# Patient Record
Sex: FEMALE | Race: WHITE | HISPANIC OR LATINO | Employment: UNEMPLOYED | URBAN - METROPOLITAN AREA
[De-identification: names, ages, dates, MRNs, and addresses within clinical notes are randomized per-mention and may not be internally consistent; named-entity substitution may affect disease eponyms.]

---

## 2017-11-02 ENCOUNTER — OFFICE VISIT (OUTPATIENT)
Dept: GASTROENTEROLOGY | Facility: CLINIC | Age: 57
End: 2017-11-02
Payer: MEDICARE

## 2017-11-02 VITALS
DIASTOLIC BLOOD PRESSURE: 80 MMHG | SYSTOLIC BLOOD PRESSURE: 136 MMHG | WEIGHT: 114.44 LBS | HEART RATE: 60 BPM | BODY MASS INDEX: 19.07 KG/M2 | HEIGHT: 65 IN

## 2017-11-02 DIAGNOSIS — R10.84 GENERALIZED ABDOMINAL PAIN: ICD-10-CM

## 2017-11-02 DIAGNOSIS — K52.9 CHRONIC DIARRHEA: Primary | ICD-10-CM

## 2017-11-02 DIAGNOSIS — R11.0 NAUSEA: ICD-10-CM

## 2017-11-02 DIAGNOSIS — R63.4 WEIGHT LOSS: ICD-10-CM

## 2017-11-02 PROCEDURE — 99999 PR PBB SHADOW E&M-NEW PATIENT-LVL III: CPT | Mod: PBBFAC,,, | Performed by: INTERNAL MEDICINE

## 2017-11-02 PROCEDURE — 99204 OFFICE O/P NEW MOD 45 MIN: CPT | Mod: S$PBB,,, | Performed by: INTERNAL MEDICINE

## 2017-11-02 PROCEDURE — 99203 OFFICE O/P NEW LOW 30 MIN: CPT | Mod: PBBFAC | Performed by: INTERNAL MEDICINE

## 2017-11-02 RX ORDER — METOPROLOL TARTRATE 50 MG/1
50 TABLET ORAL 2 TIMES DAILY
COMMUNITY
Start: 2017-10-03

## 2017-11-02 RX ORDER — DIPHENOXYLATE HYDROCHLORIDE AND ATROPINE SULFATE 2.5; .025 MG/1; MG/1
1 TABLET ORAL 4 TIMES DAILY PRN
COMMUNITY

## 2017-11-02 RX ORDER — TOPIRAMATE 100 MG/1
100 TABLET, FILM COATED ORAL 2 TIMES DAILY
COMMUNITY
Start: 2017-10-03

## 2017-11-02 RX ORDER — RIZATRIPTAN BENZOATE 10 MG/1
10 TABLET, ORALLY DISINTEGRATING ORAL
COMMUNITY
Start: 2017-10-02

## 2017-11-02 RX ORDER — TERIPARATIDE 250 UG/ML
INJECTION, SOLUTION SUBCUTANEOUS DAILY
COMMUNITY
Start: 2017-10-23 | End: 2020-05-07

## 2017-11-02 RX ORDER — DICYCLOMINE HYDROCHLORIDE 20 MG/1
20 TABLET ORAL 4 TIMES DAILY
COMMUNITY
Start: 2017-09-05

## 2017-11-02 RX ORDER — PROMETHAZINE HYDROCHLORIDE 6.25 MG/5ML
25 SYRUP ORAL EVERY 6 HOURS PRN
COMMUNITY

## 2017-11-02 NOTE — PROGRESS NOTES
Subjective:       Patient ID: Sarai Sarmiento is a 56 y.o. female.    Chief Complaint: Diarrhea and Abdominal Pain    Diarrhea going on for 3 years. She has diarrhea all day every day by her history. She has large volume BMs. She has no BRBPR or melena. She has lc-umbilical abdominal pain that cramping in character that is generally associated with BMs. There is nausea and vomiting, mainly bilious or of food eaten. There is aversion to the sight and smell of food. She has early satiety.     The diarrhea is mainly watery. She has diarrhea whether she eats or not though it maybe more if she eats more; so she tries to space her meals and eat a very small amount at a time. She is on medication for this with lomotil or she can't leave home. Response is variable. She has lost ~30 pounds over the last 3-4 yeas. There's no fever, chills or sweats. No family or aquaintances with similar issues. There's no FH of IBD or IBS and no GI malignancies.     She has had both upper and lower endoscopies with biopsies with no answers. She has had stool studies done but don't have access to records.           Review of Systems   Constitutional: Positive for chills and unexpected weight change. Negative for activity change, appetite change, diaphoresis, fatigue and fever.   HENT: Negative for congestion, ear discharge, ear pain, hearing loss, nosebleeds, postnasal drip and tinnitus.    Eyes: Negative for photophobia and visual disturbance.   Respiratory: Negative for apnea, cough, choking, chest tightness, shortness of breath and wheezing.    Cardiovascular: Negative for chest pain, palpitations and leg swelling.   Gastrointestinal: Positive for abdominal pain, diarrhea, nausea and vomiting. Negative for abdominal distention, anal bleeding, blood in stool, constipation and rectal pain.   Genitourinary: Negative for difficulty urinating, dyspareunia, dysuria, flank pain, frequency, hematuria, menstrual problem, pelvic pain, urgency,  vaginal bleeding and vaginal discharge.   Musculoskeletal: Positive for back pain. Negative for arthralgias, gait problem, joint swelling, myalgias and neck stiffness.        Low back paIn   Skin: Negative for pallor and rash.   Neurological: Positive for headaches. Negative for dizziness, tremors, seizures, syncope, speech difficulty, weakness and numbness.   Hematological: Negative for adenopathy.   Psychiatric/Behavioral: Negative for agitation, confusion, hallucinations, sleep disturbance and suicidal ideas.       Objective:      Physical Exam   Constitutional: She is oriented to person, place, and time. She appears well-developed and well-nourished.   HENT:   Head: Normocephalic and atraumatic.   Eyes: Conjunctivae and EOM are normal. Pupils are equal, round, and reactive to light. Right eye exhibits no discharge. Left eye exhibits no discharge. No scleral icterus.   Neck: Normal range of motion. Neck supple. No JVD present. No thyromegaly present.   Cardiovascular: Normal rate, regular rhythm, normal heart sounds and intact distal pulses.  Exam reveals no gallop and no friction rub.    No murmur heard.  Pulmonary/Chest: Effort normal and breath sounds normal. No respiratory distress. She has no wheezes. She has no rales. She exhibits no tenderness.   Abdominal: Soft. Bowel sounds are normal. She exhibits no distension and no mass. There is tenderness. There is no rebound and no guarding.   Mild diffuse abdominal tenderness   Musculoskeletal: Normal range of motion. She exhibits no edema.   Lymphadenopathy:     She has no cervical adenopathy.   Neurological: She is alert and oriented to person, place, and time. She has normal reflexes. She exhibits normal muscle tone. Coordination normal.   Skin: Skin is warm and dry. No rash noted. No erythema. No pallor.   Psychiatric: She has a normal mood and affect. Her behavior is normal. Judgment and thought content normal.       Assessment:     Chronic diarrhea     Nausea    Abdominal pain associated with bowel movements             Endoscopies have been normal but patient's history    Weight loss  No diagnosis found.    Plan:     Review patient's records.  Recommendations of additional evaluation pending assessment of other records.

## 2017-11-02 NOTE — LETTER
November 4, 2017      Irwin Arguello MD  [address]  [facility] [address]           [facility]  [address] [place] [id number]  Phone: [phone]  Fax: [phone]          Patient: Sarai Sarmiento   MR Number: [id number]   YOB: 1960   Date of Visit: 11/2/2017       Dear Dr. Irwin Arguello:    Thank you for referring Sarai Sarmiento to me for evaluation. Attached you will find relevant portions of my assessment and plan of care.    If you have questions, please do not hesitate to call me. I look forward to following Sarai Sarmiento along with you.    Sincerely,    Meek Mijares III, MD    Enclosure  CC:  No Recipients    If you would like to receive this communication electronically, please contact [email or web address] or [phone] to request more information on [employer] Link access.    For providers and/or their staff who would like to refer a patient to [facility], please contact us through our one-stop-shop provider referral line, [facility], at [phone].    If you feel you have received this communication in error or would no longer like to receive these types of communications, please e-mail [email or web address]

## 2017-11-24 ENCOUNTER — TELEPHONE (OUTPATIENT)
Dept: GASTROENTEROLOGY | Facility: CLINIC | Age: 57
End: 2017-11-24

## 2017-11-24 NOTE — TELEPHONE ENCOUNTER
Spoke with patient and informed patient that records from [facility] were received and scanned in chart. Patient said okay.

## 2020-05-07 ENCOUNTER — OFFICE VISIT (OUTPATIENT)
Dept: FAMILY MEDICINE | Facility: CLINIC | Age: 60
End: 2020-05-07
Payer: MEDICARE

## 2020-05-07 VITALS
BODY MASS INDEX: 19.49 KG/M2 | HEIGHT: 65 IN | SYSTOLIC BLOOD PRESSURE: 122 MMHG | HEART RATE: 62 BPM | WEIGHT: 117 LBS | TEMPERATURE: 98 F | OXYGEN SATURATION: 99 % | RESPIRATION RATE: 16 BRPM | DIASTOLIC BLOOD PRESSURE: 78 MMHG

## 2020-05-07 DIAGNOSIS — B49 FUNGAL INFECTION: Primary | ICD-10-CM

## 2020-05-07 PROCEDURE — 99204 PR OFFICE/OUTPT VISIT, NEW, LEVL IV, 45-59 MIN: ICD-10-PCS | Mod: S$GLB,,, | Performed by: NURSE PRACTITIONER

## 2020-05-07 PROCEDURE — 99204 OFFICE O/P NEW MOD 45 MIN: CPT | Mod: S$GLB,,, | Performed by: NURSE PRACTITIONER

## 2020-05-07 RX ORDER — AMLODIPINE BESYLATE 2.5 MG/1
TABLET ORAL
COMMUNITY
Start: 2020-04-17

## 2020-05-07 RX ORDER — MICAFUNGIN 20 MG/ML
100 INJECTION, POWDER, LYOPHILIZED, FOR SOLUTION INTRAVENOUS DAILY
Qty: 1 EACH | Refills: 60 | Status: SHIPPED | OUTPATIENT
Start: 2020-05-07 | End: 2020-05-07

## 2020-05-07 RX ORDER — MICAFUNGIN 20 MG/ML
100 INJECTION, POWDER, LYOPHILIZED, FOR SOLUTION INTRAVENOUS DAILY
Qty: 1 EACH | Refills: 60 | Status: SHIPPED | OUTPATIENT
Start: 2020-05-07

## 2020-05-07 RX ORDER — BENRALIZUMAB 30 MG/ML
INJECTION, SOLUTION SUBCUTANEOUS
COMMUNITY
Start: 2020-05-06

## 2020-05-07 RX ORDER — ALBUTEROL SULFATE 90 UG/1
AEROSOL, METERED RESPIRATORY (INHALATION)
COMMUNITY
Start: 2020-04-26

## 2020-05-07 RX ORDER — OXYCODONE HYDROCHLORIDE 15 MG/1
TABLET ORAL
COMMUNITY
Start: 2020-02-24

## 2020-05-07 RX ORDER — MICAFUNGIN 20 MG/ML
100 INJECTION, POWDER, LYOPHILIZED, FOR SOLUTION INTRAVENOUS DAILY
COMMUNITY
Start: 2020-04-09 | End: 2020-05-07 | Stop reason: SDUPTHER

## 2020-05-07 NOTE — LETTER
May 7, 2020      [address]  [facility] [address]           [facility]Infectious Disease  [address] [facility] [address]  Phone: [phone]  Fax: [phone]          Patient: Sarai Sarmiento   MR Number: [id number]   YOB: 1960   Date of Visit: 5/7/2020       Dear Zulma Sarmiento:    Thank you for referring Sarai Sarmiento to me for evaluation. Attached you will find relevant portions of my assessment and plan of care.    If you have questions, please do not hesitate to call me. I look forward to following Sarai Sarmiento along with you.    Sincerely,    Kaegan Morales, NP    Enclosure  CC:  No Recipients    If you would like to receive this communication electronically, please contact [email or web address] or [phone] to request more information on [employer] Link access.    For providers and/or their staff who would like to refer a patient to [facility], please contact us through our one-stop-shop provider referral line, [facility], at [phone].    If you feel you have received this communication in error or would no longer like to receive these types of communications, please e-mail [email or web address]

## 2020-05-07 NOTE — ASSESSMENT & PLAN NOTE
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     Bronchoscopy performed secondary to chronic cough, hemoptysis, history of smoking, mild persistent asthma, and chronic bronchitis in spite of at least 4 rounds of antibiotics and several rounds of corticosteroids.    Micafungin IV started on 04/14/2020 and Fasenra 04/17/2020    An additional 2 weeks of Micafungin was ordered.     She reports gradual improvement with her symptoms.    She will need 3-6 mts of therapy total. She will get a PICC line and f/u here in 1 mt.  CBC, CMP q monthly.   She will continue with IV peripheral until we can get PICC line in place. Side effects from meds d/w patient.  Length of treatment in expectations discussed with the patient.  Case discussed with Dr. Joel Mott. Agrees to all of the above.

## 2020-05-07 NOTE — PROGRESS NOTES
Infectious Diseases Clinic Note    Subjective:       Patient ID: Sarai Sarmiento is a 59 y.o. female     Chief Complaint: Referral        Sarai Sarmiento is a 59 y.o. female here today for consultation regarding scopulariopsis.  This is a new diagnosis to me. I am seeing this patient in the interim for Dr. Joel Mott.  Referral from Zulma Sarmiento .   Primary care provider is Amy Zhang MD .    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    Bronchoscopy performed secondary to chronic cough, hemoptysis, history of smoking, mild persistent asthma, and chronic bronchitis in spite of at least 4 rounds of antibiotics and several rounds of corticosteroids. + nocturnal cough that is worse with laying down. Denies GERD but recently started on Prilosec and Singulair.  Followed by Dr. Elías Nguyễn.  Recently seen by Zulma WILLINGHAM on 4/27/20.    Micafungin IV started on 04/14/2020 and Fasenra 04/17/2020. She has received a total of 4 weeks Micafungin IV. She is getting daily therapy at [facility] in .     She reports gradual improvement with her symptoms since starting Mycafungin.  She continues to have a cough with sputum production is brownish in color. Denies SOB, CARDOSO, CP. Has periodic wheezing that is worsened with heat. Otherwise she feels fine. Feels like she is improving overall.  Denies side effects from med. Denies N/V/D, jaundice, abdominal pain, fever, chills. No weight loss or loss of appetite. No acute issues reported at this time.     She is accompanied by her .                  Past Medical History:   Diagnosis Date    Asthma     Endometriosis     Fungal infection     Irritable bowel syndrome     Osteoporosis        Social History     Socioeconomic History    Marital status:      Spouse name: Not on file    Number of  children: Not on file    Years of education: Not on file    Highest education level: Not on file   Occupational History    Not on file   Social Needs    Financial resource strain: Not on file    Food insecurity:     Worry: Not on file     Inability: Not on file    Transportation needs:     Medical: Not on file     Non-medical: Not on file   Tobacco Use    Smoking status: Never Smoker    Smokeless tobacco: Never Used   Substance and Sexual Activity    Alcohol use: No    Drug use: No    Sexual activity: Yes     Partners: Male   Lifestyle    Physical activity:     Days per week: Not on file     Minutes per session: Not on file    Stress: Not on file   Relationships    Social connections:     Talks on phone: Not on file     Gets together: Not on file     Attends Oriental orthodox service: Not on file     Active member of club or organization: Not on file     Attends meetings of clubs or organizations: Not on file     Relationship status: Not on file   Other Topics Concern    Not on file   Social History Narrative    Not on file         Current Outpatient Medications:     albuterol (PROVENTIL/VENTOLIN HFA) 90 mcg/actuation inhaler, INHALE 1 TO 2 PUFFS BY MOUTH EVERY 4 HOURS, Disp: , Rfl:     amLODIPine (NORVASC) 2.5 MG tablet, , Disp: , Rfl:     diphenoxylate-atropine 2.5-0.025 mg (LOMOTIL) 2.5-0.025 mg per tablet, Take 1 tablet by mouth 4 (four) times daily as needed for Diarrhea., Disp: , Rfl:     FASENRA PEN 30 mg/mL AtIn, , Disp: , Rfl:     MAXALT-MLT 10 mg disintegrating tablet, Take 10 mg by mouth as needed. , Disp: , Rfl:     metoprolol tartrate (LOPRESSOR) 50 MG tablet, Take 50 mg by mouth 2 (two) times daily. , Disp: , Rfl:     micafungin 100 mg SolR, Inject 100 mg into the vein once daily., Disp: 1 each, Rfl: 60    oxyCODONE (ROXICODONE) 15 MG Tab, , Disp: , Rfl:     promethazine (PHENERGAN) 6.25 mg/5 mL syrup, Take 25 mg by mouth every 6 (six) hours as needed for Nausea., Disp: , Rfl:      topiramate (TOPAMAX) 100 MG tablet, Take 100 mg by mouth 2 (two) times daily. , Disp: , Rfl:     dicyclomine (BENTYL) 20 mg tablet, Take 20 mg by mouth 4 (four) times daily. , Disp: , Rfl:     FORTEO 20 mcg/dose - 600 mcg/2.4 mL PnIj, once daily. , Disp: , Rfl:     Review of Systems   Constitutional: Negative for appetite change, chills, diaphoresis, fever and unexpected weight change.   HENT: Negative for congestion, drooling, facial swelling, mouth sores, postnasal drip, rhinorrhea, sinus pain, sneezing, sore throat, tinnitus, trouble swallowing and voice change.    Eyes: Negative for photophobia and visual disturbance.   Respiratory: Positive for cough. Negative for apnea, choking, shortness of breath and wheezing.    Cardiovascular: Negative for chest pain, palpitations and leg swelling.   Gastrointestinal: Negative for abdominal pain, blood in stool, diarrhea, nausea and vomiting.   Endocrine: Negative for polydipsia, polyphagia and polyuria.   Genitourinary: Negative for difficulty urinating, dysuria, flank pain, frequency, hematuria and urgency.   Musculoskeletal: Negative for joint swelling and neck stiffness.   Skin: Negative for rash.   Neurological: Negative for dizziness, tremors, seizures, syncope, facial asymmetry and speech difficulty.   Hematological: Does not bruise/bleed easily.   Psychiatric/Behavioral: Negative for confusion, hallucinations, self-injury and suicidal ideas. The patient is not hyperactive.    All other systems reviewed and are negative.          Objective:      Vitals:    05/07/20 1309   BP: 122/78   Pulse: 62   Resp: 16   Temp: 98.1 °F (36.7 °C)     Physical Exam   Constitutional: She is oriented to person, place, and time. Vital signs are normal. She appears well-developed and well-nourished. She is active and cooperative.  Non-toxic appearance. She does not have a sickly appearance. She does not appear ill. No distress.   HENT:   Head: Normocephalic.   Nose: Nose normal.    Eyes: Pupils are equal, round, and reactive to light.   Neck: Normal range of motion.   Cardiovascular: Normal rate and regular rhythm.   Pulmonary/Chest: Effort normal and breath sounds normal. She has no wheezes. She has no rhonchi. She has no rales.   Musculoskeletal: Normal range of motion. She exhibits no edema.   Neurological: She is alert and oriented to person, place, and time.   Skin: Skin is warm and dry. No petechiae and no rash noted. No pallor.   Psychiatric: She has a normal mood and affect. Her speech is normal and behavior is normal. Judgment and thought content normal. Cognition and memory are normal.   Nursing note and vitals reviewed.          Assessment/Plan:       1. Fungal infection      Problem List Items Addressed This Visit        ID    Fungal infection - Primary    Overview     Scopulariopsis per bronchoscopy.              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     Bronchoscopy performed secondary to chronic cough, hemoptysis, history of smoking, mild persistent asthma, and chronic bronchitis in spite of at least 4 rounds of antibiotics and several rounds of corticosteroids.    Micafungin IV started on 04/14/2020 and Fasenra 04/17/2020    An additional 2 weeks of Micafungin was ordered.     She reports gradual improvement with her symptoms.    She will need 3-6 mts of therapy total. She will get a PICC line and f/u here in 1 mt.  CBC, CMP q monthly.   She will continue with IV peripheral until we can get PICC line in place. Side effects from meds d/w patient.  Length of treatment in expectations discussed with the patient.  Case discussed with Dr. Joel Mott. Agrees to all of the above.          Relevant Medications    micafungin 100 mg SolR    Other Relevant Orders    Outpatient PICC Insertion     X-Ray Chest 1 View for PICC_Central line    PICC catheter may be used for therapy after catheter placements and confirmed by CXR    CBC auto differential    Comprehensive metabolic panel         All questions were answered to her liking.  Patient voiced understanding of all the instructions given.  She will follow-up here in 1 month.     5/8/20 @ 1300 - Spoke to the pt. Will give her Xanax PRN anxiety to take 1 hr prior to her procedure.  Spoke with Miesha Colon RN at [facility]. OK with giving her Xanax before procedure. PT notified and agrees to proceed as scheduled on Tuesday.     Follow up:     There are no Patient Instructions on file for this visit.     Follow up in about 1 month (around 6/7/2020), or if symptoms worsen or fail to improve.

## 2020-05-08 RX ORDER — ALPRAZOLAM 0.5 MG/1
TABLET ORAL
Qty: 3 TABLET | Refills: 0 | Status: SHIPPED | OUTPATIENT
Start: 2020-05-08

## 2020-05-11 LAB
ALBUMIN SERPL BCP-MCNC: 4.2 G/DL (ref 3.4–5)
ALP SERPL-CCNC: 61 U/L (ref 45–117)
ALT SERPL W P-5'-P-CCNC: 58 U/L (ref 13–56)
ANION GAP SERPL CALC-SCNC: 9 MMOL/L (ref 3–11)
AST SERPL-CCNC: 27 U/L (ref 15–37)
BILIRUB SERPL-MCNC: 1 MG/DL (ref 0.2–1)
BUN SERPL-MCNC: 19 MG/DL (ref 7–18)
CALCIUM BLD-MCNC: NORMAL MG/DL
CALCIUM SERPL-MCNC: 10.1 MG/DL (ref 8.5–10.1)
CHLORIDE SERPL-SCNC: 110 MMOL/L (ref 98–107)
CO2 SERPL-SCNC: 25 MMOL/L (ref 21–32)
COVID-19 AB, IGM: NORMAL
CREAT SERPL-MCNC: 0.99 MG/DL (ref 0.55–1.02)
ERYTHROCYTE [DISTWIDTH] IN BLOOD BY AUTOMATED COUNT: 12 % (ref 0–15.5)
GFR ESTIMATION: 58
GLUCOSE SERPL-MCNC: 120 MG/DL (ref 74–106)
HCT VFR BLD AUTO: 46.7 % (ref 37–47)
HGB BLD-MCNC: 15.1 G/DL (ref 12–16)
MANUAL NRBC PER 100 CELLS: 0 % (ref 0–0.2)
MCH RBC QN AUTO: 32.1 PG (ref 27–32)
MCHC RBC AUTO-ENTMCNC: 32.3 % (ref 32–36)
MCV RBC AUTO: 99.4 FL (ref 80–99)
NRBC: 0 THOU/UL (ref 0–0.01)
PLATELETS: 295 10*3/UL (ref 130–400)
PMV BLD AUTO: 8.6 FL (ref 9.2–12.2)
POTASSIUM SERPL-SCNC: 4.9 MMOL/L (ref 3.5–5.1)
PROT SERPL-MCNC: 7.9 G/DL (ref 6.4–8.2)
RBC # BLD AUTO: 4.7 10*6/UL (ref 4.2–5.4)
SODIUM BLD-SCNC: 144 MMOL/L (ref 131–143)
WBC # BLD: 6.5 10*3/UL (ref 4.5–10)

## 2020-05-18 LAB
ALBUMIN SERPL BCP-MCNC: 3.8 G/DL (ref 3.4–5)
ALBUMIN/GLOBULIN RATIO: 1.15 RATIO (ref 1.1–1.8)
ALP SERPL-CCNC: 57 U/L (ref 46–116)
ALT SERPL W P-5'-P-CCNC: 44 U/L (ref 12–78)
AST SERPL-CCNC: 25 U/L (ref 15–37)
BASOPHILS NFR SNV MANUAL: 0.3 % (ref 0–3)
BILIRUB SERPL-MCNC: 0.4 MG/DL (ref 0–1)
BUN SERPL-MCNC: 18 MG/DL (ref 7–18)
BUN/CREAT SERPL: 16.82 RATIO (ref 7–18)
CALCIUM SERPL-MCNC: 9.1 MG/DL (ref 8.8–10.5)
CHLORIDE SERPL-SCNC: 105 MMOL/L (ref 100–108)
CO2 SERPL-SCNC: 27 MMOL/L (ref 21–32)
CREAT SERPL-MCNC: 1.07 MG/DL (ref 0.55–1.02)
EOSINOPHIL NFR SNV MANUAL: 0 % (ref 1–3)
ERYTHROCYTE [DISTWIDTH] IN BLOOD BY AUTOMATED COUNT: 11.9 % (ref 12.5–18)
GFR ESTIMATION: 52
GLOBULIN: 3.3 G/DL (ref 2.3–3.5)
GLUCOSE SERPL-MCNC: 129 MG/DL (ref 70–110)
HCT VFR BLD AUTO: 43.4 % (ref 37–47)
HGB BLD-MCNC: 14.6 G/DL (ref 12–16)
LYMPHOCYTES NFR SNV MANUAL: 21.4 % (ref 25–40)
MANUAL NRBC PER 100 CELLS: 0 %
MCH RBC QN AUTO: 32.4 PG (ref 27–31.2)
MCHC RBC AUTO-ENTMCNC: 33.6 G/DL (ref 31.8–35.4)
MCV RBC AUTO: 96.2 FL (ref 80–97)
MONOCYTES/100 LEUKOCYTES: 9.8 % (ref 1–15)
NEUTROPHILS NFR BLD: 4.12 10*3/UL (ref 1.8–7.7)
NEUTROPHILS NFR SNV MANUAL: 68.2 % (ref 37–80)
PLATELETS: 235 10*3/UL (ref 142–424)
POTASSIUM SERPL-SCNC: 4 MMOL/L (ref 3.6–5.2)
PROT SERPL-MCNC: 7.1 G/DL (ref 6.4–8.2)
RBC # BLD AUTO: 4.51 10*6/UL (ref 4.2–5.4)
SODIUM BLD-SCNC: 141 MMOL/L (ref 135–145)
WBC # BLD: 6 10*3/UL (ref 4.6–10.2)

## 2020-05-26 LAB
ALBUMIN SERPL BCP-MCNC: 3.6 G/DL (ref 3.4–5)
ALBUMIN/GLOBULIN RATIO: 1.09 RATIO (ref 1.1–1.8)
ALP SERPL-CCNC: 56 U/L (ref 46–116)
ALT SERPL W P-5'-P-CCNC: 43 U/L (ref 12–78)
ANION GAP SERPL CALC-SCNC: 10 MMOL/L (ref 3–11)
AST SERPL-CCNC: 27 U/L (ref 15–37)
BASOPHILS NFR SNV MANUAL: 0.6 % (ref 0–3)
BILIRUB SERPL-MCNC: 0.5 MG/DL (ref 0–1)
BUN SERPL-MCNC: 19 MG/DL (ref 7–18)
BUN/CREAT SERPL: 15.96 RATIO (ref 7–18)
CALCIUM SERPL-MCNC: 8.9 MG/DL (ref 8.8–10.5)
CHLORIDE SERPL-SCNC: 106 MMOL/L (ref 100–108)
CO2 SERPL-SCNC: 26 MMOL/L (ref 21–32)
CREAT SERPL-MCNC: 1.19 MG/DL (ref 0.55–1.02)
EOSINOPHIL NFR SNV MANUAL: 0 % (ref 1–3)
ERYTHROCYTE [DISTWIDTH] IN BLOOD BY AUTOMATED COUNT: 11.9 % (ref 12.5–18)
GFR ESTIMATION: 46
GLOBULIN: 3.3 G/DL (ref 2.3–3.5)
GLUCOSE SERPL-MCNC: 121 MG/DL (ref 70–110)
HCT VFR BLD AUTO: 39.1 % (ref 37–47)
HGB BLD-MCNC: 13 G/DL (ref 12–16)
LYMPHOCYTES NFR SNV MANUAL: 29.5 % (ref 25–40)
MANUAL NRBC PER 100 CELLS: 0 %
MCH RBC QN AUTO: 31.9 PG (ref 27–31.2)
MCHC RBC AUTO-ENTMCNC: 33.2 G/DL (ref 31.8–35.4)
MCV RBC AUTO: 96.1 FL (ref 80–97)
MONOCYTES/100 LEUKOCYTES: 11.4 % (ref 1–15)
NEUTROPHILS NFR BLD: 3.07 10*3/UL (ref 1.8–7.7)
NEUTROPHILS NFR SNV MANUAL: 58.1 % (ref 37–80)
PLATELETS: 216 10*3/UL (ref 142–424)
POTASSIUM SERPL-SCNC: 3.6 MMOL/L (ref 3.6–5.2)
PROT SERPL-MCNC: 6.9 G/DL (ref 6.4–8.2)
RBC # BLD AUTO: 4.07 10*6/UL (ref 4.2–5.4)
SODIUM BLD-SCNC: 142 MMOL/L (ref 135–145)
WBC # BLD: 5.3 10*3/UL (ref 4.6–10.2)

## 2020-05-28 ENCOUNTER — TELEPHONE (OUTPATIENT)
Dept: FAMILY MEDICINE | Facility: CLINIC | Age: 60
End: 2020-05-28

## 2020-05-28 NOTE — TELEPHONE ENCOUNTER
Pt called to see if her labworks improved. I informed pt that I spoke with Dhaval and he said that her labs were never bad.

## 2020-06-02 LAB
ALBUMIN SERPL BCP-MCNC: 4 G/DL (ref 3.4–5)
ALBUMIN/GLOBULIN RATIO: 1.21 RATIO (ref 1.1–1.8)
ALP SERPL-CCNC: 59 U/L (ref 46–116)
ALT SERPL W P-5'-P-CCNC: 36 U/L (ref 12–78)
ANION GAP SERPL CALC-SCNC: 10 MMOL/L (ref 3–11)
AST SERPL-CCNC: 23 U/L (ref 15–37)
BASOPHILS NFR SNV MANUAL: 0.4 % (ref 0–3)
BILIRUB SERPL-MCNC: 0.3 MG/DL (ref 0–1)
BUN SERPL-MCNC: 13 MG/DL (ref 7–18)
BUN/CREAT SERPL: 14.77 RATIO (ref 7–18)
CALCIUM SERPL-MCNC: 9.3 MG/DL (ref 8.8–10.5)
CHLORIDE SERPL-SCNC: 107 MMOL/L (ref 100–108)
CO2 SERPL-SCNC: 25 MMOL/L (ref 21–32)
CREAT SERPL-MCNC: 0.88 MG/DL (ref 0.55–1.02)
EOSINOPHIL NFR SNV MANUAL: 0 % (ref 1–3)
ERYTHROCYTE [DISTWIDTH] IN BLOOD BY AUTOMATED COUNT: 11.8 % (ref 12.5–18)
GFR ESTIMATION: > 60
GLOBULIN: 3.3 G/DL (ref 2.3–3.5)
GLUCOSE SERPL-MCNC: 111 MG/DL (ref 70–110)
HCT VFR BLD AUTO: 42.2 % (ref 37–47)
HGB BLD-MCNC: 14.1 G/DL (ref 12–16)
LYMPHOCYTES NFR SNV MANUAL: 20.1 % (ref 25–40)
MANUAL NRBC PER 100 CELLS: 0 %
MCH RBC QN AUTO: 32 PG (ref 27–31.2)
MCHC RBC AUTO-ENTMCNC: 33.4 G/DL (ref 31.8–35.4)
MCV RBC AUTO: 95.9 FL (ref 80–97)
MONOCYTES/100 LEUKOCYTES: 9.7 % (ref 1–15)
NEUTROPHILS NFR BLD: 3.94 10*3/UL (ref 1.8–7.7)
NEUTROPHILS NFR SNV MANUAL: 69.6 % (ref 37–80)
PLATELETS: 241 10*3/UL (ref 142–424)
POTASSIUM SERPL-SCNC: 3.8 MMOL/L (ref 3.6–5.2)
PROT SERPL-MCNC: 7.3 G/DL (ref 6.4–8.2)
RBC # BLD AUTO: 4.4 10*6/UL (ref 4.2–5.4)
SODIUM BLD-SCNC: 142 MMOL/L (ref 135–145)
WBC # BLD: 5.7 10*3/UL (ref 4.6–10.2)

## 2020-06-09 LAB
ALBUMIN SERPL BCP-MCNC: 3.6 G/DL (ref 3.4–5)
ALBUMIN/GLOBULIN RATIO: 1.12 RATIO (ref 1.1–1.8)
ALP SERPL-CCNC: 52 U/L (ref 46–116)
ALT SERPL W P-5'-P-CCNC: 72 U/L (ref 12–78)
ANION GAP SERPL CALC-SCNC: 11 MMOL/L (ref 3–11)
AST SERPL-CCNC: 43 U/L (ref 15–37)
BASOPHILS NFR SNV MANUAL: 0.4 % (ref 0–3)
BILIRUB SERPL-MCNC: 0.5 MG/DL (ref 0–1)
BUN SERPL-MCNC: 14 MG/DL (ref 7–18)
BUN/CREAT SERPL: 14.73 RATIO (ref 7–18)
CALCIUM SERPL-MCNC: 8.7 MG/DL (ref 8.8–10.5)
CHLORIDE SERPL-SCNC: 106 MMOL/L (ref 100–108)
CO2 SERPL-SCNC: 24 MMOL/L (ref 21–32)
CREAT SERPL-MCNC: 0.95 MG/DL (ref 0.55–1.02)
EOSINOPHIL NFR SNV MANUAL: 0 % (ref 1–3)
ERYTHROCYTE [DISTWIDTH] IN BLOOD BY AUTOMATED COUNT: 11.9 % (ref 12.5–18)
GFR ESTIMATION: 60
GLOBULIN: 3.2 G/DL (ref 2.3–3.5)
GLUCOSE SERPL-MCNC: 101 MG/DL (ref 70–110)
HCT VFR BLD AUTO: 37.4 % (ref 37–47)
HGB BLD-MCNC: 12.5 G/DL (ref 12–16)
LYMPHOCYTES NFR SNV MANUAL: 26.3 % (ref 25–40)
MANUAL NRBC PER 100 CELLS: 0 %
MCH RBC QN AUTO: 32.5 PG (ref 27–31.2)
MCHC RBC AUTO-ENTMCNC: 33.4 G/DL (ref 31.8–35.4)
MCV RBC AUTO: 97.1 FL (ref 80–97)
MONOCYTES/100 LEUKOCYTES: 10.8 % (ref 1–15)
NEUTROPHILS NFR BLD: 3.17 10*3/UL (ref 1.8–7.7)
NEUTROPHILS NFR SNV MANUAL: 62.3 % (ref 37–80)
PLATELETS: 222 10*3/UL (ref 142–424)
POTASSIUM SERPL-SCNC: 3.9 MMOL/L (ref 3.6–5.2)
PROT SERPL-MCNC: 6.8 G/DL (ref 6.4–8.2)
RBC # BLD AUTO: 3.85 10*6/UL (ref 4.2–5.4)
SODIUM BLD-SCNC: 141 MMOL/L (ref 135–145)
WBC # BLD: 5.1 10*3/UL (ref 4.6–10.2)

## 2020-06-15 ENCOUNTER — OFFICE VISIT (OUTPATIENT)
Dept: FAMILY MEDICINE | Facility: CLINIC | Age: 60
End: 2020-06-15
Payer: MEDICARE

## 2020-06-15 VITALS
WEIGHT: 117 LBS | OXYGEN SATURATION: 98 % | HEART RATE: 71 BPM | SYSTOLIC BLOOD PRESSURE: 115 MMHG | DIASTOLIC BLOOD PRESSURE: 81 MMHG | BODY MASS INDEX: 19.47 KG/M2 | RESPIRATION RATE: 16 BRPM

## 2020-06-15 DIAGNOSIS — B49 FUNGAL INFECTION: Primary | ICD-10-CM

## 2020-06-15 PROCEDURE — 99213 PR OFFICE/OUTPT VISIT, EST, LEVL III, 20-29 MIN: ICD-10-PCS | Mod: S$GLB,,, | Performed by: NURSE PRACTITIONER

## 2020-06-15 PROCEDURE — 99213 OFFICE O/P EST LOW 20 MIN: CPT | Mod: S$GLB,,, | Performed by: NURSE PRACTITIONER

## 2020-06-15 NOTE — ASSESSMENT & PLAN NOTE
Currently on 2nd month of Mycafungin.  Labs look good. Continue current therapy and f/u w/ Dr. Mott in 2 months.      Advised she will likely need 4-6 months of therapy total. To be decided by Pulm and ID.     Return to clinic PRN if complications of PICC line developed or side effects of medication

## 2020-06-15 NOTE — PROGRESS NOTES
Infectious Diseases Clinic Note    Subjective:       Patient ID: Sarai Sarmiento is a 59 y.o. female     Chief Complaint: Follow-up        Sarai Sarmiento is a 59 y.o. female here today for 1 mth f/u regarding scopulariopsis.   I am seeing this patient in the interim for Dr. Joel Mott.  Referral from Zulma Sarmiento .   Primary care provider is Amy Zhang MD .    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    Bronchoscopy performed secondary to chronic cough, hemoptysis, history of smoking, mild persistent asthma, and chronic bronchitis in spite of at least 4 rounds of antibiotics and several rounds of corticosteroids. + nocturnal cough that is worse with laying down. Denies GERD but recently started on Prilosec and Singulair.  Followed by Dr. Elías Nguyễn.  Recently seen by Zulma WILLINGHAM on 4/27/20.    Micafungin IV started on 04/14/2020 and Fasenra 04/17/2020. She has received a total of 4 weeks Micafungin IV. She is getting daily therapy at [facility] in .  Currently has PICC line in left upper extremity.  Site is unremarkable.  She reports doing well with IV therapy.  She reports no acute issues today. She reports gradual improvement with her symptoms since starting Mycafungin.  She continues to have a cough with sputum production is brownish in color. Denies SOB, CARDOSO, CP. Has periodic wheezing that is worsened with heat. Otherwise she feels fine. Feels like she is improving overall.  Denies side effects from med. Denies N/V/D, jaundice, abdominal pain, fever, chills. No weight loss or loss of appetite. No acute issues reported at this time.     She is having weekly labs.                  Past Medical History:   Diagnosis Date    Asthma     Endometriosis     Fungal infection     Irritable bowel syndrome     Osteoporosis         Social History     Socioeconomic History    Marital status:      Spouse name: Not on file    Number of children: Not on file    Years of education: Not on file    Highest education level: Not on file   Occupational History    Not on file   Social Needs    Financial resource strain: Not on file    Food insecurity     Worry: Not on file     Inability: Not on file    Transportation needs     Medical: Not on file     Non-medical: Not on file   Tobacco Use    Smoking status: Never Smoker    Smokeless tobacco: Never Used   Substance and Sexual Activity    Alcohol use: No    Drug use: No    Sexual activity: Yes     Partners: Male   Lifestyle    Physical activity     Days per week: Not on file     Minutes per session: Not on file    Stress: Not on file   Relationships    Social connections     Talks on phone: Not on file     Gets together: Not on file     Attends Mu-ism service: Not on file     Active member of club or organization: Not on file     Attends meetings of clubs or organizations: Not on file     Relationship status: Not on file   Other Topics Concern    Not on file   Social History Narrative    Not on file         Current Outpatient Medications:     albuterol (PROVENTIL/VENTOLIN HFA) 90 mcg/actuation inhaler, INHALE 1 TO 2 PUFFS BY MOUTH EVERY 4 HOURS, Disp: , Rfl:     ALPRAZolam (XANAX) 0.5 MG tablet, Take 1 tablet by mouth 1 hr prior to procedure. Take an additional tablet PRN 15 min prior to procedure if desired effect not achieved., Disp: 3 tablet, Rfl: 0    amLODIPine (NORVASC) 2.5 MG tablet, , Disp: , Rfl:     dicyclomine (BENTYL) 20 mg tablet, Take 20 mg by mouth 4 (four) times daily. , Disp: , Rfl:     diphenoxylate-atropine 2.5-0.025 mg (LOMOTIL) 2.5-0.025 mg per tablet, Take 1 tablet by mouth 4 (four) times daily as needed for Diarrhea., Disp: , Rfl:     FASENRA PEN 30 mg/mL AtIn, , Disp: , Rfl:     MAXALT-MLT 10 mg disintegrating tablet, Take 10 mg by mouth as  needed. , Disp: , Rfl:     metoprolol tartrate (LOPRESSOR) 50 MG tablet, Take 50 mg by mouth 2 (two) times daily. , Disp: , Rfl:     micafungin 100 mg SolR, Inject 100 mg into the vein once daily., Disp: 1 each, Rfl: 60    oxyCODONE (ROXICODONE) 15 MG Tab, , Disp: , Rfl:     promethazine (PHENERGAN) 6.25 mg/5 mL syrup, Take 25 mg by mouth every 6 (six) hours as needed for Nausea., Disp: , Rfl:     topiramate (TOPAMAX) 100 MG tablet, Take 100 mg by mouth 2 (two) times daily. , Disp: , Rfl:     Review of Systems   Constitutional: Negative for appetite change, chills, diaphoresis, fever and unexpected weight change.   HENT: Negative for congestion, drooling, facial swelling, mouth sores, postnasal drip, rhinorrhea, sinus pain, sneezing, sore throat, tinnitus, trouble swallowing and voice change.    Eyes: Negative for photophobia and visual disturbance.   Respiratory: Positive for cough. Negative for apnea, choking, shortness of breath and wheezing.    Cardiovascular: Negative for chest pain, palpitations and leg swelling.   Gastrointestinal: Negative for abdominal pain, blood in stool, diarrhea, nausea and vomiting.   Endocrine: Negative for polydipsia, polyphagia and polyuria.   Genitourinary: Negative for difficulty urinating, dysuria, flank pain, frequency, hematuria and urgency.   Musculoskeletal: Negative for joint swelling and neck stiffness.   Skin: Negative for rash.   Neurological: Negative for dizziness, tremors, seizures, syncope, facial asymmetry and speech difficulty.   Hematological: Does not bruise/bleed easily.   Psychiatric/Behavioral: Negative for confusion, hallucinations, self-injury and suicidal ideas. The patient is not hyperactive.    All other systems reviewed and are negative.          Objective:      Vitals:    06/15/20 1502   BP: 115/81   Pulse: 71   Resp: 16     Physical Exam  Vitals signs and nursing note reviewed.   Constitutional:       General: She is not in acute distress.      Appearance: She is well-developed. She is not ill-appearing or toxic-appearing.   HENT:      Head: Normocephalic.      Nose: Nose normal.   Eyes:      Pupils: Pupils are equal, round, and reactive to light.   Neck:      Musculoskeletal: Normal range of motion.   Cardiovascular:      Rate and Rhythm: Normal rate and regular rhythm.   Pulmonary:      Effort: Pulmonary effort is normal.      Breath sounds: Normal breath sounds. No wheezing, rhonchi or rales.   Musculoskeletal: Normal range of motion.   Skin:     General: Skin is warm and dry.      Coloration: Skin is not pale.      Findings: No petechiae or rash.          Neurological:      Mental Status: She is alert and oriented to person, place, and time.   Psychiatric:         Speech: Speech normal.         Behavior: Behavior normal. Behavior is cooperative.         Thought Content: Thought content normal.         Judgment: Judgment normal.             Assessment/Plan:       1. Fungal infection      Problem List Items Addressed This Visit        ID    Fungal infection - Primary    Overview     Scopulariopsis per bronchoscopy.              Current Assessment & Plan     Currently on 2nd month of Mycafungin.  Labs look good. Continue current therapy and f/u w/ Dr. Mott in 2 months.      Advised she will likely need 4-6 months of therapy total. To be decided by Pulm and ID.     Return to clinic PRN if complications of PICC line developed or side effects of medication               Labs reviewed with the patient.  Her renal status has returned normal.  All questions answered to her liking.  Patient voiced understanding of all instructions.    Follow up:     There are no Patient Instructions on file for this visit.     Follow up in about 2 months (around 8/15/2020), or if symptoms worsen or fail to improve.

## 2020-06-16 LAB
ALBUMIN SERPL BCP-MCNC: 3.9 G/DL (ref 3.4–5)
ALBUMIN/GLOBULIN RATIO: 1.3 RATIO (ref 1.1–1.8)
ALP SERPL-CCNC: 51 U/L (ref 46–116)
ALT SERPL W P-5'-P-CCNC: 45 U/L (ref 12–78)
ANION GAP SERPL CALC-SCNC: 9 MMOL/L (ref 3–11)
AST SERPL-CCNC: 19 U/L (ref 15–37)
BASOPHILS NFR SNV MANUAL: 0.4 % (ref 0–3)
BILIRUB SERPL-MCNC: 0.2 MG/DL (ref 0–1)
BUN SERPL-MCNC: 14 MG/DL (ref 7–18)
BUN/CREAT SERPL: 17.5 RATIO (ref 7–18)
CALCIUM SERPL-MCNC: 9.5 MG/DL (ref 8.8–10.5)
CHLORIDE SERPL-SCNC: 110 MMOL/L (ref 100–108)
CO2 SERPL-SCNC: 27 MMOL/L (ref 21–32)
CREAT SERPL-MCNC: 0.8 MG/DL (ref 0.55–1.02)
EOSINOPHIL NFR SNV MANUAL: 0 % (ref 1–3)
ERYTHROCYTE [DISTWIDTH] IN BLOOD BY AUTOMATED COUNT: 11.9 % (ref 12.5–18)
GFR ESTIMATION: > 60
GLOBULIN: 3 G/DL (ref 2.3–3.5)
GLUCOSE SERPL-MCNC: 103 MG/DL (ref 70–110)
HCT VFR BLD AUTO: 39.9 % (ref 37–47)
HGB BLD-MCNC: 13.2 G/DL (ref 12–16)
LYMPHOCYTES NFR SNV MANUAL: 19.4 % (ref 25–40)
MANUAL NRBC PER 100 CELLS: 0 %
MCH RBC QN AUTO: 32.3 PG (ref 27–31.2)
MCHC RBC AUTO-ENTMCNC: 33.1 G/DL (ref 31.8–35.4)
MCV RBC AUTO: 97.6 FL (ref 80–97)
MONOCYTES/100 LEUKOCYTES: 9.6 % (ref 1–15)
NEUTROPHILS NFR BLD: 4.71 10*3/UL (ref 1.8–7.7)
NEUTROPHILS NFR SNV MANUAL: 70.3 % (ref 37–80)
PLATELETS: 240 10*3/UL (ref 142–424)
POTASSIUM SERPL-SCNC: 4.4 MMOL/L (ref 3.6–5.2)
PROT SERPL-MCNC: 6.9 G/DL (ref 6.4–8.2)
RBC # BLD AUTO: 4.09 10*6/UL (ref 4.2–5.4)
SODIUM BLD-SCNC: 146 MMOL/L (ref 135–145)
WBC # BLD: 6.7 10*3/UL (ref 4.6–10.2)

## 2020-06-23 LAB
ALBUMIN SERPL BCP-MCNC: 3.9 G/DL (ref 3.4–5)
ALBUMIN/GLOBULIN RATIO: 1.11 RATIO (ref 1.1–1.8)
ALP SERPL-CCNC: 49 U/L (ref 46–116)
ALT SERPL W P-5'-P-CCNC: 29 U/L (ref 12–78)
ANION GAP SERPL CALC-SCNC: 8 MMOL/L (ref 3–11)
AST SERPL-CCNC: 17 U/L (ref 15–37)
BASOPHILS NFR SNV MANUAL: 0.7 % (ref 0–3)
BILIRUB SERPL-MCNC: 0.5 MG/DL (ref 0–1)
BUN SERPL-MCNC: 19 MG/DL (ref 7–18)
BUN/CREAT SERPL: 18.81 RATIO (ref 7–18)
CALCIUM SERPL-MCNC: 9.3 MG/DL (ref 8.8–10.5)
CHLORIDE SERPL-SCNC: 108 MMOL/L (ref 100–108)
CO2 SERPL-SCNC: 25 MMOL/L (ref 21–32)
CREAT SERPL-MCNC: 1.01 MG/DL (ref 0.55–1.02)
EOSINOPHIL NFR SNV MANUAL: 0 % (ref 1–3)
ERYTHROCYTE [DISTWIDTH] IN BLOOD BY AUTOMATED COUNT: 11.8 % (ref 12.5–18)
GFR ESTIMATION: 56
GLOBULIN: 3.5 G/DL (ref 2.3–3.5)
GLUCOSE SERPL-MCNC: 114 MG/DL (ref 70–110)
HCT VFR BLD AUTO: 41.8 % (ref 37–47)
HGB BLD-MCNC: 14.1 G/DL (ref 12–16)
LYMPHOCYTES NFR SNV MANUAL: 25.4 % (ref 25–40)
MANUAL NRBC PER 100 CELLS: 0 %
MCH RBC QN AUTO: 32.3 PG (ref 27–31.2)
MCHC RBC AUTO-ENTMCNC: 33.7 G/DL (ref 31.8–35.4)
MCV RBC AUTO: 95.9 FL (ref 80–97)
MONOCYTES/100 LEUKOCYTES: 10 % (ref 1–15)
NEUTROPHILS NFR BLD: 3.44 10*3/UL (ref 1.8–7.7)
NEUTROPHILS NFR SNV MANUAL: 63.7 % (ref 37–80)
PLATELETS: 241 10*3/UL (ref 142–424)
POTASSIUM SERPL-SCNC: 3.6 MMOL/L (ref 3.6–5.2)
PROT SERPL-MCNC: 7.4 G/DL (ref 6.4–8.2)
RBC # BLD AUTO: 4.36 10*6/UL (ref 4.2–5.4)
SODIUM BLD-SCNC: 141 MMOL/L (ref 135–145)
WBC # BLD: 5.4 10*3/UL (ref 4.6–10.2)

## 2020-06-30 LAB
ALBUMIN SERPL BCP-MCNC: 3.8 G/DL (ref 3.4–5)
ALBUMIN/GLOBULIN RATIO: 1.15 RATIO (ref 1.1–1.8)
ALP SERPL-CCNC: 46 U/L (ref 46–116)
ALT SERPL W P-5'-P-CCNC: 25 U/L (ref 12–78)
ANION GAP SERPL CALC-SCNC: 9 MMOL/L (ref 3–11)
AST SERPL-CCNC: 14 U/L (ref 15–37)
BASOPHILS NFR SNV MANUAL: 0.7 % (ref 0–3)
BILIRUB SERPL-MCNC: 0.5 MG/DL (ref 0–1)
BUN SERPL-MCNC: 15 MG/DL (ref 7–18)
BUN/CREAT SERPL: 13.27 RATIO (ref 7–18)
CALCIUM SERPL-MCNC: 9.2 MG/DL (ref 8.8–10.5)
CHLORIDE SERPL-SCNC: 107 MMOL/L (ref 100–108)
CO2 SERPL-SCNC: 25 MMOL/L (ref 21–32)
CREAT SERPL-MCNC: 1.13 MG/DL (ref 0.55–1.02)
EOSINOPHIL NFR SNV MANUAL: 0 % (ref 1–3)
ERYTHROCYTE [DISTWIDTH] IN BLOOD BY AUTOMATED COUNT: 11.9 % (ref 12.5–18)
GFR ESTIMATION: 49
GLOBULIN: 3.3 G/DL (ref 2.3–3.5)
GLUCOSE SERPL-MCNC: 110 MG/DL (ref 70–110)
HCT VFR BLD AUTO: 40.2 % (ref 37–47)
HGB BLD-MCNC: 13.4 G/DL (ref 12–16)
LYMPHOCYTES NFR SNV MANUAL: 30.7 % (ref 25–40)
MANUAL NRBC PER 100 CELLS: 0 %
MCH RBC QN AUTO: 32.1 PG (ref 27–31.2)
MCHC RBC AUTO-ENTMCNC: 33.3 G/DL (ref 31.8–35.4)
MCV RBC AUTO: 96.4 FL (ref 80–97)
MONOCYTES/100 LEUKOCYTES: 12 % (ref 1–15)
NEUTROPHILS NFR BLD: 2.44 10*3/UL (ref 1.8–7.7)
NEUTROPHILS NFR SNV MANUAL: 56.4 % (ref 37–80)
PLATELETS: 225 10*3/UL (ref 142–424)
POTASSIUM SERPL-SCNC: 3.5 MMOL/L (ref 3.6–5.2)
PROT SERPL-MCNC: 7.1 G/DL (ref 6.4–8.2)
RBC # BLD AUTO: 4.17 10*6/UL (ref 4.2–5.4)
SODIUM BLD-SCNC: 141 MMOL/L (ref 135–145)
WBC # BLD: 4.3 10*3/UL (ref 4.6–10.2)

## 2020-07-07 LAB
ALBUMIN SERPL BCP-MCNC: 3.8 G/DL (ref 3.4–5)
ALBUMIN/GLOBULIN RATIO: 1.12 RATIO (ref 1.1–1.8)
ALP SERPL-CCNC: 46 U/L (ref 46–116)
ALT SERPL W P-5'-P-CCNC: 24 U/L (ref 12–78)
ANION GAP SERPL CALC-SCNC: 11 MMOL/L (ref 3–11)
AST SERPL-CCNC: 13 U/L (ref 15–37)
BASOPHILS NFR SNV MANUAL: 0.7 % (ref 0–3)
BILIRUB SERPL-MCNC: 0.3 MG/DL (ref 0–1)
BUN SERPL-MCNC: 12 MG/DL (ref 7–18)
BUN/CREAT SERPL: 11.65 RATIO (ref 7–18)
CALCIUM SERPL-MCNC: 9.6 MG/DL (ref 8.8–10.5)
CHLORIDE SERPL-SCNC: 108 MMOL/L (ref 100–108)
CO2 SERPL-SCNC: 25 MMOL/L (ref 21–32)
CREAT SERPL-MCNC: 1.03 MG/DL (ref 0.55–1.02)
EOSINOPHIL NFR SNV MANUAL: 0 % (ref 1–3)
ERYTHROCYTE [DISTWIDTH] IN BLOOD BY AUTOMATED COUNT: 11.8 % (ref 12.5–18)
GFR ESTIMATION: 55
GLOBULIN: 3.4 G/DL (ref 2.3–3.5)
GLUCOSE SERPL-MCNC: 124 MG/DL (ref 70–110)
HCT VFR BLD AUTO: 41.8 % (ref 37–47)
HGB BLD-MCNC: 13.5 G/DL (ref 12–16)
LYMPHOCYTES NFR SNV MANUAL: 20.9 % (ref 25–40)
MANUAL NRBC PER 100 CELLS: 0 %
MCH RBC QN AUTO: 31.8 PG (ref 27–31.2)
MCHC RBC AUTO-ENTMCNC: 32.3 G/DL (ref 31.8–35.4)
MCV RBC AUTO: 98.6 FL (ref 80–97)
MONOCYTES/100 LEUKOCYTES: 8.4 % (ref 1–15)
NEUTROPHILS NFR BLD: 3.83 10*3/UL (ref 1.8–7.7)
NEUTROPHILS NFR SNV MANUAL: 69.8 % (ref 37–80)
PLATELETS: 231 10*3/UL (ref 142–424)
POTASSIUM SERPL-SCNC: 3.7 MMOL/L (ref 3.6–5.2)
PROT SERPL-MCNC: 7.2 G/DL (ref 6.4–8.2)
RBC # BLD AUTO: 4.24 10*6/UL (ref 4.2–5.4)
SODIUM BLD-SCNC: 144 MMOL/L (ref 135–145)
WBC # BLD: 5.5 10*3/UL (ref 4.6–10.2)

## 2020-07-14 LAB
ALBUMIN SERPL BCP-MCNC: 3.7 G/DL (ref 3.4–5)
ALBUMIN/GLOBULIN RATIO: 1.09 RATIO (ref 1.1–1.8)
ALP SERPL-CCNC: 43 U/L (ref 46–116)
ALT SERPL W P-5'-P-CCNC: 26 U/L (ref 12–78)
ANION GAP SERPL CALC-SCNC: 8 MMOL/L (ref 3–11)
AST SERPL-CCNC: 16 U/L (ref 15–37)
BASOPHILS NFR SNV MANUAL: 0.3 % (ref 0–3)
BILIRUB SERPL-MCNC: 0.3 MG/DL (ref 0–1)
BUN SERPL-MCNC: 19 MG/DL (ref 7–18)
BUN/CREAT SERPL: 18.62 RATIO (ref 7–18)
CALCIUM SERPL-MCNC: 9 MG/DL (ref 8.8–10.5)
CHLORIDE SERPL-SCNC: 110 MMOL/L (ref 100–108)
CO2 SERPL-SCNC: 24 MMOL/L (ref 21–32)
CREAT SERPL-MCNC: 1.02 MG/DL (ref 0.55–1.02)
EOSINOPHIL NFR SNV MANUAL: 0 % (ref 1–3)
ERYTHROCYTE [DISTWIDTH] IN BLOOD BY AUTOMATED COUNT: 11.8 % (ref 12.5–18)
GFR ESTIMATION: 55
GLOBULIN: 3.4 G/DL (ref 2.3–3.5)
GLUCOSE SERPL-MCNC: 98 MG/DL (ref 70–110)
HCT VFR BLD AUTO: 40.7 % (ref 37–47)
HGB BLD-MCNC: 13.3 G/DL (ref 12–16)
LYMPHOCYTES NFR SNV MANUAL: 19.2 % (ref 25–40)
MANUAL NRBC PER 100 CELLS: 0 %
MCH RBC QN AUTO: 32.1 PG (ref 27–31.2)
MCHC RBC AUTO-ENTMCNC: 32.7 G/DL (ref 31.8–35.4)
MCV RBC AUTO: 98.3 FL (ref 80–97)
MONOCYTES/100 LEUKOCYTES: 7 % (ref 1–15)
NEUTROPHILS NFR BLD: 4.94 10*3/UL (ref 1.8–7.7)
NEUTROPHILS NFR SNV MANUAL: 73.4 % (ref 37–80)
PLATELETS: 235 10*3/UL (ref 142–424)
POTASSIUM SERPL-SCNC: 3.8 MMOL/L (ref 3.6–5.2)
PROT SERPL-MCNC: 7.1 G/DL (ref 6.4–8.2)
RBC # BLD AUTO: 4.14 10*6/UL (ref 4.2–5.4)
SODIUM BLD-SCNC: 142 MMOL/L (ref 135–145)
WBC # BLD: 6.7 10*3/UL (ref 4.6–10.2)

## 2020-07-16 ENCOUNTER — OFFICE VISIT (OUTPATIENT)
Dept: FAMILY MEDICINE | Facility: CLINIC | Age: 60
End: 2020-07-16
Payer: MEDICARE

## 2020-07-16 VITALS
DIASTOLIC BLOOD PRESSURE: 71 MMHG | RESPIRATION RATE: 16 BRPM | HEIGHT: 65 IN | SYSTOLIC BLOOD PRESSURE: 97 MMHG | BODY MASS INDEX: 19.47 KG/M2 | HEART RATE: 67 BPM | OXYGEN SATURATION: 98 % | TEMPERATURE: 98 F

## 2020-07-16 DIAGNOSIS — B49 FUNGAL INFECTION: ICD-10-CM

## 2020-07-16 DIAGNOSIS — Z79.2 LONG TERM (CURRENT) USE OF ANTIBIOTICS: Primary | ICD-10-CM

## 2020-07-16 PROCEDURE — 99213 OFFICE O/P EST LOW 20 MIN: CPT | Mod: AQ,S$GLB,, | Performed by: INTERNAL MEDICINE

## 2020-07-16 PROCEDURE — 99213 PR OFFICE/OUTPT VISIT, EST, LEVL III, 20-29 MIN: ICD-10-PCS | Mod: AQ,S$GLB,, | Performed by: INTERNAL MEDICINE

## 2020-07-16 NOTE — PROGRESS NOTES
Subjective:       Patient ID: Sarai Sarmiento is a 59 y.o. female.    Chief Complaint: Follow-up (ID F/U.)    Patient is here for follow-up on chronic pulmonary fungal infection.  She has been on long-term micafungin.  She is on month 4 of this.  She has tolerated therapy well with no significant adverse effects.  She gets daily infusion at [facility].  She has a PICC line in place in her left upper arm, denies any bleeding, redness, or drainage.  No recent fever or chills.  She reports that her pulmonary symptoms have improved significantly.  She is anxious to finish therapy so that she can return to a more normal level of activity.    Review of Systems   Constitutional: Negative for chills and fever.   HENT: Negative for sore throat.    Respiratory: Negative for cough and shortness of breath.    Cardiovascular: Negative for chest pain and palpitations.   Gastrointestinal: Negative for diarrhea, nausea and vomiting.   Skin: Negative for rash and wound.       Objective:      Physical Exam  Vitals signs and nursing note reviewed.   Constitutional:       Appearance: She is well-developed.   Cardiovascular:      Rate and Rhythm: Normal rate and regular rhythm.      Heart sounds: No murmur.   Pulmonary:      Effort: Pulmonary effort is normal. No respiratory distress.      Breath sounds: Normal breath sounds. No wheezing.   Abdominal:      General: Bowel sounds are normal.      Palpations: Abdomen is soft.      Tenderness: There is no abdominal tenderness.   Musculoskeletal:      Comments: PICC line in place left upper extremity   Skin:     General: Skin is warm and dry.      Findings: No rash.         Assessment:       1. Long term (current) use of antibiotics    2. Fungal infection        Plan:       Problem List Items Addressed This Visit        ID    Fungal infection     She is responding well to therapy.  Remains uncontrolled but improved.  Plan to continue current medication for 1 more month.  I will  discuss with pulmonology to see if they would like any further studies prior to discontinuation of therapy.         Long term (current) use of antibiotics - Primary     No adverse effects noted.  Plan to continue to monitor weekly labs.  No issues with PICC line.

## 2020-07-16 NOTE — ASSESSMENT & PLAN NOTE
She is responding well to therapy.  Remains uncontrolled but improved.  Plan to continue current medication for 1 more month.  I will discuss with pulmonology to see if they would like any further studies prior to discontinuation of therapy.

## 2020-07-21 LAB
ALBUMIN SERPL BCP-MCNC: 3.7 G/DL (ref 3.4–5)
ALBUMIN/GLOBULIN RATIO: 1.23 RATIO (ref 1.1–1.8)
ALP SERPL-CCNC: 41 U/L (ref 46–116)
ALT SERPL W P-5'-P-CCNC: 24 U/L (ref 12–78)
ANION GAP SERPL CALC-SCNC: 7 MMOL/L (ref 3–11)
AST SERPL-CCNC: 16 U/L (ref 15–37)
BASOPHILS NFR SNV MANUAL: 0.3 % (ref 0–3)
BILIRUB SERPL-MCNC: 0.4 MG/DL (ref 0–1)
BUN SERPL-MCNC: 14 MG/DL (ref 7–18)
BUN/CREAT SERPL: 15.05 RATIO (ref 7–18)
CALCIUM SERPL-MCNC: 8.8 MG/DL (ref 8.8–10.5)
CHLORIDE SERPL-SCNC: 104 MMOL/L (ref 100–108)
CO2 SERPL-SCNC: 26 MMOL/L (ref 21–32)
CREAT SERPL-MCNC: 0.93 MG/DL (ref 0.55–1.02)
EOSINOPHIL NFR SNV MANUAL: 0 % (ref 1–3)
ERYTHROCYTE [DISTWIDTH] IN BLOOD BY AUTOMATED COUNT: 11.8 % (ref 12.5–18)
GFR ESTIMATION: > 60
GLOBULIN: 3 G/DL (ref 2.3–3.5)
GLUCOSE SERPL-MCNC: 99 MG/DL (ref 70–110)
HCT VFR BLD AUTO: 38.1 % (ref 37–47)
HGB BLD-MCNC: 13 G/DL (ref 12–16)
LYMPHOCYTES NFR SNV MANUAL: 25.5 % (ref 25–40)
MANUAL NRBC PER 100 CELLS: 0 %
MCH RBC QN AUTO: 32 PG (ref 27–31.2)
MCHC RBC AUTO-ENTMCNC: 34.1 G/DL (ref 31.8–35.4)
MCV RBC AUTO: 93.8 FL (ref 80–97)
MONOCYTES/100 LEUKOCYTES: 10 % (ref 1–15)
NEUTROPHILS NFR BLD: 3.9 10*3/UL (ref 1.8–7.7)
NEUTROPHILS NFR SNV MANUAL: 64 % (ref 37–80)
PLATELETS: 235 10*3/UL (ref 142–424)
POTASSIUM SERPL-SCNC: 3.8 MMOL/L (ref 3.6–5.2)
PROT SERPL-MCNC: 6.7 G/DL (ref 6.4–8.2)
RBC # BLD AUTO: 4.06 10*6/UL (ref 4.2–5.4)
SODIUM BLD-SCNC: 137 MMOL/L (ref 135–145)
WBC # BLD: 6.1 10*3/UL (ref 4.6–10.2)

## 2020-07-28 LAB
ALBUMIN SERPL BCP-MCNC: 3.9 G/DL (ref 3.4–5)
ALBUMIN/GLOBULIN RATIO: 1.11 RATIO (ref 1.1–1.8)
ALP SERPL-CCNC: 49 U/L (ref 46–116)
ALT SERPL W P-5'-P-CCNC: 24 U/L (ref 12–78)
ANION GAP SERPL CALC-SCNC: 11 MMOL/L (ref 3–11)
AST SERPL-CCNC: 15 U/L (ref 15–37)
BASOPHILS NFR SNV MANUAL: 0.5 % (ref 0–3)
BILIRUB SERPL-MCNC: 0.4 MG/DL (ref 0–1)
BUN SERPL-MCNC: 17 MG/DL (ref 7–18)
BUN/CREAT SERPL: 15.74 RATIO (ref 7–18)
CALCIUM SERPL-MCNC: 9.7 MG/DL (ref 8.8–10.5)
CHLORIDE SERPL-SCNC: 109 MMOL/L (ref 100–108)
CO2 SERPL-SCNC: 23 MMOL/L (ref 21–32)
CREAT SERPL-MCNC: 1.08 MG/DL (ref 0.55–1.02)
EOSINOPHIL NFR SNV MANUAL: 0 % (ref 1–3)
ERYTHROCYTE [DISTWIDTH] IN BLOOD BY AUTOMATED COUNT: 11.8 % (ref 12.5–18)
GFR ESTIMATION: 52
GLOBULIN: 3.5 G/DL (ref 2.3–3.5)
GLUCOSE SERPL-MCNC: 106 MG/DL (ref 70–110)
HCT VFR BLD AUTO: 42.7 % (ref 37–47)
HGB BLD-MCNC: 14 G/DL (ref 12–16)
LYMPHOCYTES NFR SNV MANUAL: 20.7 % (ref 25–40)
MANUAL NRBC PER 100 CELLS: 0 %
MCH RBC QN AUTO: 32 PG (ref 27–31.2)
MCHC RBC AUTO-ENTMCNC: 32.8 G/DL (ref 31.8–35.4)
MCV RBC AUTO: 97.7 FL (ref 80–97)
MONOCYTES/100 LEUKOCYTES: 9.1 % (ref 1–15)
NEUTROPHILS NFR BLD: 4.37 10*3/UL (ref 1.8–7.7)
NEUTROPHILS NFR SNV MANUAL: 69.4 % (ref 37–80)
PLATELETS: 236 10*3/UL (ref 142–424)
POTASSIUM SERPL-SCNC: 3.7 MMOL/L (ref 3.6–5.2)
PROT SERPL-MCNC: 7.4 G/DL (ref 6.4–8.2)
RBC # BLD AUTO: 4.37 10*6/UL (ref 4.2–5.4)
SODIUM BLD-SCNC: 143 MMOL/L (ref 135–145)
WBC # BLD: 6.3 10*3/UL (ref 4.6–10.2)

## 2020-08-03 ENCOUNTER — TELEPHONE (OUTPATIENT)
Dept: FAMILY MEDICINE | Facility: CLINIC | Age: 60
End: 2020-08-03

## 2020-08-04 LAB
ALBUMIN SERPL BCP-MCNC: 3.9 G/DL (ref 3.4–5)
ALBUMIN/GLOBULIN RATIO: 1.11 RATIO (ref 1.1–1.8)
ALP SERPL-CCNC: 46 U/L (ref 46–116)
ALT SERPL W P-5'-P-CCNC: 21 U/L (ref 12–78)
ANION GAP SERPL CALC-SCNC: 10 MMOL/L (ref 3–11)
AST SERPL-CCNC: 11 U/L (ref 15–37)
BASOPHILS NFR SNV MANUAL: 0.4 % (ref 0–3)
BILIRUB SERPL-MCNC: 0.5 MG/DL (ref 0–1)
BUN SERPL-MCNC: 16 MG/DL (ref 7–18)
BUN/CREAT SERPL: 16.66 RATIO (ref 7–18)
CALCIUM SERPL-MCNC: 9.5 MG/DL (ref 8.8–10.5)
CHLORIDE SERPL-SCNC: 108 MMOL/L (ref 100–108)
CO2 SERPL-SCNC: 24 MMOL/L (ref 21–32)
CREAT SERPL-MCNC: 0.96 MG/DL (ref 0.55–1.02)
EOSINOPHIL NFR SNV MANUAL: 0 % (ref 1–3)
ERYTHROCYTE [DISTWIDTH] IN BLOOD BY AUTOMATED COUNT: 11.9 % (ref 12.5–18)
GFR ESTIMATION: 59
GLOBULIN: 3.5 G/DL (ref 2.3–3.5)
GLUCOSE SERPL-MCNC: 103 MG/DL (ref 70–110)
HCT VFR BLD AUTO: 40.3 % (ref 37–47)
HGB BLD-MCNC: 13.4 G/DL (ref 12–16)
LYMPHOCYTES NFR SNV MANUAL: 25.9 % (ref 25–40)
MANUAL NRBC PER 100 CELLS: 0 %
MCH RBC QN AUTO: 32.1 PG (ref 27–31.2)
MCHC RBC AUTO-ENTMCNC: 33.3 G/DL (ref 31.8–35.4)
MCV RBC AUTO: 96.4 FL (ref 80–97)
MONOCYTES/100 LEUKOCYTES: 11.6 % (ref 1–15)
NEUTROPHILS NFR BLD: 3.15 10*3/UL (ref 1.8–7.7)
NEUTROPHILS NFR SNV MANUAL: 61.7 % (ref 37–80)
PLATELETS: 239 10*3/UL (ref 142–424)
POTASSIUM SERPL-SCNC: 3.6 MMOL/L (ref 3.6–5.2)
PROT SERPL-MCNC: 7.4 G/DL (ref 6.4–8.2)
RBC # BLD AUTO: 4.18 10*6/UL (ref 4.2–5.4)
SODIUM BLD-SCNC: 142 MMOL/L (ref 135–145)
WBC # BLD: 5.1 10*3/UL (ref 4.6–10.2)

## 2020-08-11 LAB
ALBUMIN SERPL BCP-MCNC: 3.9 G/DL (ref 3.4–5)
ALBUMIN/GLOBULIN RATIO: 1.18 RATIO (ref 1.1–1.8)
ALP SERPL-CCNC: 47 U/L (ref 46–116)
ALT SERPL W P-5'-P-CCNC: 27 U/L (ref 12–78)
ANION GAP SERPL CALC-SCNC: 11 MMOL/L (ref 3–11)
AST SERPL-CCNC: 16 U/L (ref 15–37)
BASOPHILS NFR SNV MANUAL: 0.5 % (ref 0–3)
BILIRUB SERPL-MCNC: 0.5 MG/DL (ref 0–1)
BUN SERPL-MCNC: 15 MG/DL (ref 7–18)
BUN/CREAT SERPL: 14.85 RATIO (ref 7–18)
CALCIUM SERPL-MCNC: 9.5 MG/DL (ref 8.8–10.5)
CHLORIDE SERPL-SCNC: 106 MMOL/L (ref 100–108)
CO2 SERPL-SCNC: 26 MMOL/L (ref 21–32)
CREAT SERPL-MCNC: 1.01 MG/DL (ref 0.55–1.02)
EOSINOPHIL NFR SNV MANUAL: 0 % (ref 1–3)
ERYTHROCYTE [DISTWIDTH] IN BLOOD BY AUTOMATED COUNT: 12 % (ref 12.5–18)
GFR ESTIMATION: 56
GLOBULIN: 3.3 G/DL (ref 2.3–3.5)
GLUCOSE SERPL-MCNC: 123 MG/DL (ref 70–110)
HCT VFR BLD AUTO: 42.1 % (ref 37–47)
HGB BLD-MCNC: 14 G/DL (ref 12–16)
LYMPHOCYTES NFR SNV MANUAL: 19.5 % (ref 25–40)
MANUAL NRBC PER 100 CELLS: 0 %
MCH RBC QN AUTO: 32.3 PG (ref 27–31.2)
MCHC RBC AUTO-ENTMCNC: 33.3 G/DL (ref 31.8–35.4)
MCV RBC AUTO: 97 FL (ref 80–97)
MONOCYTES/100 LEUKOCYTES: 9.4 % (ref 1–15)
NEUTROPHILS NFR BLD: 4.25 10*3/UL (ref 1.8–7.7)
NEUTROPHILS NFR SNV MANUAL: 70.3 % (ref 37–80)
PLATELETS: 231 10*3/UL (ref 142–424)
POTASSIUM SERPL-SCNC: 3.5 MMOL/L (ref 3.6–5.2)
PROT SERPL-MCNC: 7.2 G/DL (ref 6.4–8.2)
RBC # BLD AUTO: 4.34 10*6/UL (ref 4.2–5.4)
SODIUM BLD-SCNC: 143 MMOL/L (ref 135–145)
WBC # BLD: 6.1 10*3/UL (ref 4.6–10.2)

## 2020-08-18 LAB
ALBUMIN SERPL BCP-MCNC: 3.8 G/DL (ref 3.4–5)
ALBUMIN/GLOBULIN RATIO: 1.08 RATIO (ref 1.1–1.8)
ALP SERPL-CCNC: 51 U/L (ref 46–116)
ALT SERPL W P-5'-P-CCNC: 27 U/L (ref 12–78)
ANION GAP SERPL CALC-SCNC: 8 MMOL/L (ref 3–11)
AST SERPL-CCNC: 16 U/L (ref 15–37)
BASOPHILS NFR SNV MANUAL: 0.3 % (ref 0–3)
BILIRUB SERPL-MCNC: 0.4 MG/DL (ref 0–1)
BUN SERPL-MCNC: 12 MG/DL (ref 7–18)
BUN/CREAT SERPL: 12.5 RATIO (ref 7–18)
CALCIUM SERPL-MCNC: 9.6 MG/DL (ref 8.8–10.5)
CHLORIDE SERPL-SCNC: 106 MMOL/L (ref 100–108)
CO2 SERPL-SCNC: 27 MMOL/L (ref 21–32)
CREAT SERPL-MCNC: 0.96 MG/DL (ref 0.55–1.02)
EOSINOPHIL NFR SNV MANUAL: 0 % (ref 1–3)
ERYTHROCYTE [DISTWIDTH] IN BLOOD BY AUTOMATED COUNT: 12 % (ref 12.5–18)
GFR ESTIMATION: 59
GLOBULIN: 3.5 G/DL (ref 2.3–3.5)
GLUCOSE SERPL-MCNC: 99 MG/DL (ref 70–110)
HCT VFR BLD AUTO: 40.7 % (ref 37–47)
HGB BLD-MCNC: 13.2 G/DL (ref 12–16)
LYMPHOCYTES NFR SNV MANUAL: 19.3 % (ref 25–40)
MANUAL NRBC PER 100 CELLS: 0 %
MCH RBC QN AUTO: 31.8 PG (ref 27–31.2)
MCHC RBC AUTO-ENTMCNC: 32.4 G/DL (ref 31.8–35.4)
MCV RBC AUTO: 98.1 FL (ref 80–97)
MONOCYTES/100 LEUKOCYTES: 8.3 % (ref 1–15)
NEUTROPHILS NFR BLD: 4.16 10*3/UL (ref 1.8–7.7)
NEUTROPHILS NFR SNV MANUAL: 71.8 % (ref 37–80)
PLATELETS: 258 10*3/UL (ref 142–424)
POTASSIUM SERPL-SCNC: 3.7 MMOL/L (ref 3.6–5.2)
PROT SERPL-MCNC: 7.3 G/DL (ref 6.4–8.2)
RBC # BLD AUTO: 4.15 10*6/UL (ref 4.2–5.4)
SODIUM BLD-SCNC: 141 MMOL/L (ref 135–145)
WBC # BLD: 5.8 10*3/UL (ref 4.6–10.2)

## 2020-08-20 ENCOUNTER — OFFICE VISIT (OUTPATIENT)
Dept: FAMILY MEDICINE | Facility: CLINIC | Age: 60
End: 2020-08-20
Payer: MEDICARE

## 2020-08-20 VITALS
SYSTOLIC BLOOD PRESSURE: 119 MMHG | OXYGEN SATURATION: 99 % | RESPIRATION RATE: 16 BRPM | HEART RATE: 73 BPM | BODY MASS INDEX: 19.49 KG/M2 | DIASTOLIC BLOOD PRESSURE: 81 MMHG | HEIGHT: 65 IN | WEIGHT: 117 LBS | TEMPERATURE: 99 F

## 2020-08-20 DIAGNOSIS — B49 FUNGAL INFECTION: ICD-10-CM

## 2020-08-20 DIAGNOSIS — Z79.2 LONG TERM (CURRENT) USE OF ANTIBIOTICS: ICD-10-CM

## 2020-08-20 PROCEDURE — 99213 PR OFFICE/OUTPT VISIT, EST, LEVL III, 20-29 MIN: ICD-10-PCS | Mod: AQ,S$GLB,, | Performed by: INTERNAL MEDICINE

## 2020-08-20 PROCEDURE — 99213 OFFICE O/P EST LOW 20 MIN: CPT | Mod: AQ,S$GLB,, | Performed by: INTERNAL MEDICINE

## 2020-08-20 RX ORDER — BUDESONIDE AND FORMOTEROL FUMARATE DIHYDRATE 160; 4.5 UG/1; UG/1
2 AEROSOL RESPIRATORY (INHALATION) EVERY 12 HOURS
COMMUNITY

## 2020-08-20 RX ORDER — BETAMETHASONE VALERATE 1.2 MG/G
1 CREAM TOPICAL 2 TIMES DAILY
COMMUNITY
Start: 2020-08-06

## 2020-08-20 NOTE — ASSESSMENT & PLAN NOTE
Labs reviewed and discussed, all normal at this time.  Plan to stop therapy today.  Discussed with pulmonary and they are on board as well.  Follow up with them as scheduled, follow up here PRN.

## 2020-08-20 NOTE — ASSESSMENT & PLAN NOTE
Completed a long term course of micafungin, symptoms have since resolved and she has tolerated this well.  PICC line is removed today, no bleeding or any other immediate complications noted.

## 2020-08-20 NOTE — PROGRESS NOTES
Subjective:       Patient ID: Sarai Sarmiento is a 59 y.o. female.    Chief Complaint: No chief complaint on file.    Here to follow up on fungal infection.  Continues on micafungin, labs are followed weekly and have been in acceptable range.  No acute symptoms at this time.  No recent fever or chills.    Review of Systems   Constitutional: Negative for chills and fever.   HENT: Negative for sore throat.    Respiratory: Negative for cough and shortness of breath.    Cardiovascular: Negative for chest pain and palpitations.   Gastrointestinal: Negative for diarrhea, nausea and vomiting.   Skin: Negative for rash and wound.       Objective:      Physical Exam  Vitals signs and nursing note reviewed.   Constitutional:       Appearance: She is well-developed.   Cardiovascular:      Rate and Rhythm: Normal rate and regular rhythm.      Heart sounds: No murmur.   Pulmonary:      Effort: Pulmonary effort is normal. No respiratory distress.      Breath sounds: Normal breath sounds. No wheezing.   Abdominal:      General: Bowel sounds are normal.      Palpations: Abdomen is soft.      Tenderness: There is no abdominal tenderness.   Skin:     General: Skin is warm and dry.      Findings: No rash.         Assessment:       1. Fungal infection    2. Long term (current) use of antibiotics        Plan:       Problem List Items Addressed This Visit        ID    Fungal infection     Completed a long term course of micafungin, symptoms have since resolved and she has tolerated this well.  PICC line is removed today, no bleeding or any other immediate complications noted.           Long term (current) use of antibiotics     Labs reviewed and discussed, all normal at this time.  Plan to stop therapy today.  Discussed with pulmonary and they are on board as well.  Follow up with them as scheduled, follow up here PRN.

## 2020-10-13 ENCOUNTER — TELEPHONE (OUTPATIENT)
Dept: FAMILY MEDICINE | Facility: CLINIC | Age: 60
End: 2020-10-13

## 2020-10-13 NOTE — TELEPHONE ENCOUNTER
We dont typically handle these PCP related issues. Dr. Roque can administer. Dr. Nguyễn is her pulmonologist... not sure if she is calling the right person. Zulma Sarmiento is his PA.     If Dr. Zhang is not reachable and she can not get Dr. Nguyễn then I will do this one time.  She needs to let me know where to send the medication.

## 2020-12-04 ENCOUNTER — TELEPHONE (OUTPATIENT)
Dept: FAMILY MEDICINE | Facility: CLINIC | Age: 60
End: 2020-12-04
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
## 2021-07-21 ENCOUNTER — TELEPHONE (OUTPATIENT)
Dept: FAMILY MEDICINE | Facility: CLINIC | Age: 61
End: 2021-07-21

## 2022-01-27 PROBLEM — S46.011A TRAUMATIC ROTATOR CUFF TEAR, RIGHT, INITIAL ENCOUNTER: Status: ACTIVE | Noted: 2022-01-27

## 2022-01-27 PROBLEM — M75.121 COMPLETE TEAR OF RIGHT ROTATOR CUFF: Status: ACTIVE | Noted: 2022-01-27

## 2022-04-12 PROBLEM — M19.011 PRIMARY OSTEOARTHRITIS OF RIGHT SHOULDER: Status: ACTIVE | Noted: 2022-04-12
